# Patient Record
Sex: FEMALE | Race: BLACK OR AFRICAN AMERICAN | Employment: PART TIME | URBAN - METROPOLITAN AREA
[De-identification: names, ages, dates, MRNs, and addresses within clinical notes are randomized per-mention and may not be internally consistent; named-entity substitution may affect disease eponyms.]

---

## 2018-09-30 ENCOUNTER — HOSPITAL ENCOUNTER (EMERGENCY)
Age: 68
Discharge: HOME | End: 2018-09-30
Payer: MEDICARE

## 2018-09-30 ENCOUNTER — APPOINTMENT (OUTPATIENT)
Dept: GENERAL RADIOLOGY | Age: 68
End: 2018-09-30
Payer: MEDICARE

## 2018-09-30 VITALS
BODY MASS INDEX: 26.2 KG/M2 | WEIGHT: 163 LBS | RESPIRATION RATE: 16 BRPM | DIASTOLIC BLOOD PRESSURE: 73 MMHG | OXYGEN SATURATION: 97 % | HEIGHT: 66 IN | HEART RATE: 76 BPM | TEMPERATURE: 97.1 F | SYSTOLIC BLOOD PRESSURE: 157 MMHG

## 2018-09-30 DIAGNOSIS — S82.092A OTHER CLOSED FRACTURE OF LEFT PATELLA, INITIAL ENCOUNTER: Primary | ICD-10-CM

## 2018-09-30 PROCEDURE — 99283 EMERGENCY DEPT VISIT LOW MDM: CPT

## 2018-09-30 PROCEDURE — 6370000000 HC RX 637 (ALT 250 FOR IP): Performed by: NURSE PRACTITIONER

## 2018-09-30 PROCEDURE — 73562 X-RAY EXAM OF KNEE 3: CPT

## 2018-09-30 RX ORDER — HYDROCODONE BITARTRATE AND ACETAMINOPHEN 5; 325 MG/1; MG/1
1 TABLET ORAL EVERY 6 HOURS PRN
Qty: 12 TABLET | Refills: 0 | Status: SHIPPED | OUTPATIENT
Start: 2018-09-30 | End: 2018-10-03

## 2018-09-30 RX ORDER — HYDROCODONE BITARTRATE AND ACETAMINOPHEN 5; 325 MG/1; MG/1
1 TABLET ORAL ONCE
Status: COMPLETED | OUTPATIENT
Start: 2018-09-30 | End: 2018-09-30

## 2018-09-30 RX ADMIN — HYDROCODONE BITARTRATE AND ACETAMINOPHEN 1 TABLET: 5; 325 TABLET ORAL at 20:40

## 2018-09-30 ASSESSMENT — PAIN DESCRIPTION - DESCRIPTORS: DESCRIPTORS: ACHING

## 2018-09-30 ASSESSMENT — PAIN SCALES - GENERAL: PAINLEVEL_OUTOF10: 6

## 2018-09-30 ASSESSMENT — PAIN DESCRIPTION - ORIENTATION: ORIENTATION: LEFT

## 2018-09-30 ASSESSMENT — PAIN DESCRIPTION - PAIN TYPE: TYPE: ACUTE PAIN

## 2018-09-30 ASSESSMENT — PAIN DESCRIPTION - LOCATION: LOCATION: KNEE

## 2019-10-04 ENCOUNTER — APPOINTMENT (OUTPATIENT)
Dept: GENERAL RADIOLOGY | Age: 69
End: 2019-10-04
Payer: MEDICARE

## 2019-10-04 ENCOUNTER — HOSPITAL ENCOUNTER (EMERGENCY)
Age: 69
Discharge: HOME | End: 2019-10-04
Payer: MEDICARE

## 2019-10-04 VITALS
BODY MASS INDEX: 25.55 KG/M2 | WEIGHT: 159 LBS | TEMPERATURE: 97.6 F | SYSTOLIC BLOOD PRESSURE: 152 MMHG | HEIGHT: 66 IN | OXYGEN SATURATION: 96 % | HEART RATE: 78 BPM | RESPIRATION RATE: 18 BRPM | DIASTOLIC BLOOD PRESSURE: 94 MMHG

## 2019-10-04 DIAGNOSIS — M25.462 EFFUSION OF LEFT KNEE: ICD-10-CM

## 2019-10-04 DIAGNOSIS — M25.562 ACUTE PAIN OF LEFT KNEE: Primary | ICD-10-CM

## 2019-10-04 PROCEDURE — 73564 X-RAY EXAM KNEE 4 OR MORE: CPT

## 2019-10-04 PROCEDURE — 6370000000 HC RX 637 (ALT 250 FOR IP): Performed by: NURSE PRACTITIONER

## 2019-10-04 PROCEDURE — 99283 EMERGENCY DEPT VISIT LOW MDM: CPT

## 2019-10-04 RX ORDER — IBUPROFEN 600 MG/1
600 TABLET ORAL EVERY 8 HOURS PRN
Qty: 21 TABLET | Refills: 0 | Status: SHIPPED | OUTPATIENT
Start: 2019-10-04

## 2019-10-04 RX ORDER — IBUPROFEN 400 MG/1
600 TABLET ORAL ONCE
Status: COMPLETED | OUTPATIENT
Start: 2019-10-04 | End: 2019-10-04

## 2019-10-04 RX ADMIN — IBUPROFEN 600 MG: 400 TABLET, FILM COATED ORAL at 17:33

## 2019-10-04 ASSESSMENT — PAIN SCALES - GENERAL: PAINLEVEL_OUTOF10: 6

## 2021-02-17 ENCOUNTER — HOSPITAL ENCOUNTER (OUTPATIENT)
Age: 71
Discharge: HOME | End: 2021-02-17
Payer: MEDICARE

## 2021-02-17 LAB
ALBUMIN SERPL-MCNC: 4.1 G/DL (ref 3.5–5.2)
ALP BLD-CCNC: 66 U/L (ref 35–104)
ALT SERPL-CCNC: 14 U/L (ref 0–32)
ANION GAP SERPL CALCULATED.3IONS-SCNC: 11 MMOL/L (ref 7–16)
AST SERPL-CCNC: 22 U/L (ref 0–31)
BASOPHILS ABSOLUTE: 0.04 E9/L (ref 0–0.2)
BASOPHILS RELATIVE PERCENT: 0.9 % (ref 0–2)
BILIRUB SERPL-MCNC: 0.6 MG/DL (ref 0–1.2)
BUN BLDV-MCNC: 16 MG/DL (ref 8–23)
CALCIUM SERPL-MCNC: 9.3 MG/DL (ref 8.6–10.2)
CHLORIDE BLD-SCNC: 105 MMOL/L (ref 98–107)
CO2: 27 MMOL/L (ref 22–29)
CREAT SERPL-MCNC: 1.2 MG/DL (ref 0.5–1)
EOSINOPHILS ABSOLUTE: 0.15 E9/L (ref 0.05–0.5)
EOSINOPHILS RELATIVE PERCENT: 3.3 % (ref 0–6)
FERRITIN: 89 NG/ML
FOLATE: 13.5 NG/ML (ref 4.8–24.2)
GFR AFRICAN AMERICAN: 53
GFR NON-AFRICAN AMERICAN: 53 ML/MIN/1.73
GLUCOSE BLD-MCNC: 85 MG/DL (ref 74–99)
HCT VFR BLD CALC: 36.6 % (ref 34–48)
HEMOGLOBIN: 11.4 G/DL (ref 11.5–15.5)
IMMATURE GRANULOCYTES #: 0 E9/L
IMMATURE GRANULOCYTES %: 0 % (ref 0–5)
IRON SATURATION: 34 % (ref 15–50)
IRON: 115 MCG/DL (ref 37–145)
LYMPHOCYTES ABSOLUTE: 1.75 E9/L (ref 1.5–4)
LYMPHOCYTES RELATIVE PERCENT: 39.1 % (ref 20–42)
MCH RBC QN AUTO: 27 PG (ref 26–35)
MCHC RBC AUTO-ENTMCNC: 31.1 % (ref 32–34.5)
MCV RBC AUTO: 86.7 FL (ref 80–99.9)
MONOCYTES ABSOLUTE: 0.42 E9/L (ref 0.1–0.95)
MONOCYTES RELATIVE PERCENT: 9.4 % (ref 2–12)
NEUTROPHILS ABSOLUTE: 2.12 E9/L (ref 1.8–7.3)
NEUTROPHILS RELATIVE PERCENT: 47.3 % (ref 43–80)
PDW BLD-RTO: 14.5 FL (ref 11.5–15)
PLATELET # BLD: 222 E9/L (ref 130–450)
PMV BLD AUTO: 11.1 FL (ref 7–12)
POTASSIUM SERPL-SCNC: 4 MMOL/L (ref 3.5–5)
RBC # BLD: 4.22 E12/L (ref 3.5–5.5)
SODIUM BLD-SCNC: 143 MMOL/L (ref 132–146)
TOTAL IRON BINDING CAPACITY: 339 MCG/DL (ref 250–450)
TOTAL PROTEIN: 7.9 G/DL (ref 6.4–8.3)
TSH SERPL DL<=0.05 MIU/L-ACNC: 2.55 UIU/ML (ref 0.27–4.2)
VITAMIN B-12: 426 PG/ML (ref 211–946)
WBC # BLD: 4.5 E9/L (ref 4.5–11.5)

## 2021-02-17 PROCEDURE — 80053 COMPREHEN METABOLIC PANEL: CPT

## 2021-02-17 PROCEDURE — 87045 FECES CULTURE AEROBIC BACT: CPT

## 2021-02-17 PROCEDURE — 89055 LEUKOCYTE ASSESSMENT FECAL: CPT

## 2021-02-17 PROCEDURE — 82607 VITAMIN B-12: CPT

## 2021-02-17 PROCEDURE — 87077 CULTURE AEROBIC IDENTIFY: CPT

## 2021-02-17 PROCEDURE — 87329 GIARDIA AG IA: CPT

## 2021-02-17 PROCEDURE — 83550 IRON BINDING TEST: CPT

## 2021-02-17 PROCEDURE — 83516 IMMUNOASSAY NONANTIBODY: CPT

## 2021-02-17 PROCEDURE — 85025 COMPLETE CBC W/AUTO DIFF WBC: CPT

## 2021-02-17 PROCEDURE — 83540 ASSAY OF IRON: CPT

## 2021-02-17 PROCEDURE — 84443 ASSAY THYROID STIM HORMONE: CPT

## 2021-02-17 PROCEDURE — 82728 ASSAY OF FERRITIN: CPT

## 2021-02-17 PROCEDURE — 36415 COLL VENOUS BLD VENIPUNCTURE: CPT

## 2021-02-17 PROCEDURE — 82746 ASSAY OF FOLIC ACID SERUM: CPT

## 2021-02-18 ENCOUNTER — HOSPITAL ENCOUNTER (OUTPATIENT)
Age: 71
Discharge: HOME | End: 2021-02-18
Payer: MEDICARE

## 2021-02-19 LAB
GIARDIA ANTIGEN STOOL: NORMAL
WHITE BLOOD CELLS (WBC), STOOL: NORMAL

## 2021-02-20 LAB
CULTURE, STOOL: NORMAL
TISSUE TRANSGLUTAMINASE ANTIBODY: 3 U/ML (ref 0–5)
TISSUE TRANSGLUTAMINASE IGA: <2 U/ML (ref 0–3)

## 2021-10-28 ENCOUNTER — HOSPITAL ENCOUNTER (OUTPATIENT)
Age: 71
Discharge: HOME | End: 2021-10-28
Payer: MEDICARE

## 2021-10-28 PROCEDURE — 87338 HPYLORI STOOL AG IA: CPT

## 2021-10-30 LAB — H PYLORI ANTIGEN STOOL: NEGATIVE

## 2022-01-12 ENCOUNTER — HOSPITAL ENCOUNTER (OUTPATIENT)
Dept: GENERAL RADIOLOGY | Age: 72
Discharge: HOME | End: 2022-01-14
Payer: MEDICARE

## 2022-01-12 DIAGNOSIS — Z12.31 VISIT FOR SCREENING MAMMOGRAM: ICD-10-CM

## 2022-01-12 PROCEDURE — 77063 BREAST TOMOSYNTHESIS BI: CPT

## 2022-01-14 ENCOUNTER — TELEPHONE (OUTPATIENT)
Dept: GENERAL RADIOLOGY | Age: 72
End: 2022-01-14

## 2022-01-14 NOTE — TELEPHONE ENCOUNTER
Call to patient in reference to her mammogram performed at [address] [place] [address] Dr Reyes on January 12, 2022. Instructed patient that the radiologist has recommended some additional breast imaging, in order to make a final determination/result. Verbalizes understanding and is agreeable to proceed.        Margarito Farmer, RN, BSN, [address]

## 2022-01-20 ENCOUNTER — HOSPITAL ENCOUNTER (OUTPATIENT)
Dept: GENERAL RADIOLOGY | Age: 72
Discharge: HOME | End: 2022-01-22
Payer: MEDICARE

## 2022-01-20 DIAGNOSIS — R92.8 ABNORMAL MAMMOGRAM OF RIGHT BREAST: ICD-10-CM

## 2022-01-20 PROCEDURE — 76642 ULTRASOUND BREAST LIMITED: CPT

## 2022-01-20 PROCEDURE — 77065 DX MAMMO INCL CAD UNI: CPT

## 2023-02-28 ENCOUNTER — APPOINTMENT (OUTPATIENT)
Dept: GENERAL RADIOLOGY | Age: 73
End: 2023-02-28
Payer: MEDICARE

## 2023-02-28 ENCOUNTER — APPOINTMENT (OUTPATIENT)
Dept: CT IMAGING | Age: 73
End: 2023-02-28
Payer: MEDICARE

## 2023-02-28 ENCOUNTER — HOSPITAL ENCOUNTER (EMERGENCY)
Age: 73
Discharge: HOME | End: 2023-02-28
Payer: MEDICARE

## 2023-02-28 VITALS
WEIGHT: 150 LBS | RESPIRATION RATE: 18 BRPM | DIASTOLIC BLOOD PRESSURE: 98 MMHG | TEMPERATURE: 97.4 F | BODY MASS INDEX: 24.21 KG/M2 | SYSTOLIC BLOOD PRESSURE: 154 MMHG | OXYGEN SATURATION: 100 % | HEART RATE: 61 BPM

## 2023-02-28 DIAGNOSIS — S00.83XA CONTUSION OF FACE, INITIAL ENCOUNTER: ICD-10-CM

## 2023-02-28 DIAGNOSIS — M25.462 EFFUSION, LEFT KNEE: ICD-10-CM
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
## 2023-02-28 DIAGNOSIS — M25.562 ACUTE PAIN OF LEFT KNEE: ICD-10-CM

## 2023-02-28 DIAGNOSIS — S09.90XA CLOSED HEAD INJURY, INITIAL ENCOUNTER: ICD-10-CM

## 2023-02-28 PROCEDURE — 73562 X-RAY EXAM OF KNEE 3: CPT

## 2023-02-28 PROCEDURE — 70450 CT HEAD/BRAIN W/O DYE: CPT

## 2023-02-28 PROCEDURE — 72125 CT NECK SPINE W/O DYE: CPT

## 2023-02-28 PROCEDURE — 99284 EMERGENCY DEPT VISIT MOD MDM: CPT

## 2023-02-28 PROCEDURE — 6370000000 HC RX 637 (ALT 250 FOR IP): Performed by: PHYSICIAN ASSISTANT

## 2023-02-28 RX ORDER — ACETAMINOPHEN 500 MG
1000 TABLET ORAL ONCE
Status: COMPLETED | OUTPATIENT
Start: 2023-02-28 | End: 2023-02-28

## 2023-02-28 RX ADMIN — ACETAMINOPHEN 1000 MG: 500 TABLET ORAL at 11:32

## 2023-02-28 ASSESSMENT — LIFESTYLE VARIABLES
HOW MANY STANDARD DRINKS CONTAINING ALCOHOL DO YOU HAVE ON A TYPICAL DAY: PATIENT DOES NOT DRINK
HOW OFTEN DO YOU HAVE A DRINK CONTAINING ALCOHOL: NEVER

## 2023-02-28 ASSESSMENT — PAIN - FUNCTIONAL ASSESSMENT: PAIN_FUNCTIONAL_ASSESSMENT: 0-10

## 2023-02-28 ASSESSMENT — PAIN SCALES - GENERAL
PAINLEVEL_OUTOF10: 5
PAINLEVEL_OUTOF10: 5

## 2023-02-28 NOTE — Clinical Note
Marleen Christina was seen and treated in our emergency department on 2/28/2023. She may return to work on 03/01/2023. If you have any questions or concerns, please don't hesitate to call.       Emelia Kwan PA-C

## 2023-02-28 NOTE — ED PROVIDER NOTES
Independent [facility] Visit. Department of Emergency Medicine   ED  Provider Note  Admit Date/Time: 2/28/2023 11:10 AM  ED Bed: ST04/ST-04     MRN: [id number]  CHIEF COMPLAINT:   Fall (Slip on wet floor at work, landed on left knee and hit left of face )       HISTORY OF PRESENT ILLNESS:      Fabiana Martinez is a 67 y.o. female who presents to the ED for mechanical fall at that happened at work prior to arrival.  Patient states she slipped on the wet floor and fell onto her left knee. She also reports hitting the left side of her face and head. She denies any loss of consciousness. Patient does not take anticoagulation. She states she does have some soreness to the left side of her face and left neck. She denies chest pain, shortness of breath, pain with breathing, abdominal pain, nausea, vomiting, numbness/tingling, sensation changes, back pain, or difficulty with ambulation or balance. Patient does have pain to her left knee but is still able to ambulate independently. She does report a history of meniscus tear to affected knee. She is alert and oriented x3 and in no apparent distress at this exam.  Patient is nontoxic-appearing. PCP: Candy Sweeney MD  Ortho: Dr. Kaylah Mendoza:   Pertinent positives and negatives are stated within HPI, all other systems reviewed and are negative. Past Medical History:   Past Medical History:   Diagnosis Date    Heart disease     MITRAL VALVE PROLAPSE    History of blood transfusion     Hypertension      Past Surgical History:   Past Surgical History:   Procedure Laterality Date    BACK SURGERY      HYSTERECTOMY      KNEE ARTHROSCOPY      left    TUBAL LIGATION       Social History:  reports that she has never smoked. She does not have any smokeless tobacco history on file. She reports that she does not drink alcohol. Family History: family history includes Lung Cancer in her brother; Other in her sister; Seizures in her son.      Allergies: Codeine    PHYSICAL EXAM:     Vitals:    02/28/23 0944 02/28/23 1013   BP:  (!) 154/98   Pulse: 61    Resp:  18   Temp: 97.4 °F (36.3 °C)    TempSrc: Temporal    SpO2: 100%    Weight:  150 lb (68 kg)   Oxygen Saturation Interpretation: Normal    Physical Exam   Constitutional/General: Alert and oriented x4, well appearing, non toxic in NAD  HEENT:  NC/NT. EOMI, Airway patent, No broken or loose teeth, no blood in ears or nares, no bruising  Neck: Supple, full ROM, non tender with no meningeal signs, no lymphadenopathy   Respiratory: Lungs clear to auscultation bilaterally with good air flow, Not in respiratory distress  CV:  Regular rate. Regular rhythm. GI:  Abdomen soft, Non tender, Non distended. No rebound, guarding, or rigidity. No pulsatile masses. Musculoskeletal: Mild tenderness to palpation to left knee, full range of motion, intact sensations distally   Integument: Skin warm and dry. No rashes. Neurologic: GCS 15, no focal deficits, symmetric strength 5/5 in the upper and lower extremities bilaterally, CN II-XII grossly intact    PROCEDURES:   None     LAB/IMAGING RESULTS:   (All laboratory and radiology results have been personally reviewed by myself)  Labs:  No results found for this visit on 02/28/23. Imaging: All Radiology results interpreted by Radiologist unless otherwise noted. XR KNEE LEFT (3 VIEWS)   Final Result   Degenerative changes and moderately-sized effusion. Probable old patella   fracture. No acute bony abnormality. CT HEAD WO CONTRAST   Final Result   1. There is no acute intracranial abnormality. Specifically, there is no   intracranial hemorrhage. 2. Atrophy and periventricular leukomalacia,         CT CERVICAL SPINE WO CONTRAST   Final Result   1. There is no acute compression fracture or subluxation of the cervical   spine. 2. Multilevel degenerative disc and degenerative joint disease.            -- MEDICAL DECISION MAKING --   History From: History from : Patient  Limitations to history : None    Record Review:  Outpatient Notes reviewed  Other Records Reviewed : None    CC/HPI Summary, DDx, ED Course, and Reassessment:   Patient presents to the ED for Fall (Slip on wet floor at work, landed on left knee and hit left of face )    This is a 69-year-old after having a mechanical fall at work prior to arrival.  X-ray of the knee revealed effusion, CT head and neck revealed no intracranial bleeding, facial bone fractures, or cervical fractures. Patient advised to follow-up with orthopedics. She will also be given Worker's Comp. follow-up    Patient was given the following medications:  Medications   acetaminophen (TYLENOL) tablet 1,000 mg (1,000 mg Oral Given 2/28/23 1132)      Differential diagnoses included but not limited to patellar fracture, knee contusion, closed head injury      Reassessment:  Patient was given Tylenol for their symptoms with mild improvement. Patient continues to be non-toxic on re-evaluation. Chronic Conditions:   Past Medical History:   Diagnosis Date    Heart disease     MITRAL VALVE PROLAPSE    History of blood transfusion     Hypertension      ED COURSE:      Any labs and imaging were reviewed by myself. Consultations:  None  Discussion with Other Profesionals : None    COUNSELING:   I have spoken with the patient/caregiver and discussed todays results, in addition to providing specific details for the plan of care and counseling regarding the diagnosis and prognosis and are agreeable with the plan. All results reviewed with pt and all questions answered. I discussed the differential, results and discharge plan with the patient and/or family/friend/caregiver if present. I emphasized the importance of follow-up with the physician I referred them to in the timeframe recommended. I explained reasons for the patient to return to the Emergency Department.  Additional verbal discharge instructions were also given and discussed with the patient to supplement those generated by the EMR. We also discussed medications that were prescribed (if any) including common side effects and interactions. The patient was advised to abstain from driving, operating heavy machinery or making significant decisions while taking medications such as opiates and muscle relaxers that may impair this. All questions were addressed. They understand return precautions and discharge instructions. The patient and/or family/friend/caregiver expressed understanding. Vitals were stable and they were in no distress at discharge. Findings were discussed with the patient and reasons to immediately return to the ED were articulated to them. Patient will follow-up with their PMD  DISPOSITION CONSIDERATIONS:    Disposition Considerations:  (include Tests not done, Shared Decision Making, Pt Expectation of Test or Tx.):   Appropriate for outpatient management        Social Determinants:  Social Determinants : None    MEDICATIONS:   DISCHARGE MEDICATIONS:  Discharge Medication List as of 2/28/2023 11:47 AM        DISCONTINUED MEDICATIONS:  Discharge Medication List as of 2/28/2023 11:47 AM        DIAGNOSIS:     1. Fall on same level from slipping, tripping or stumbling, initial encounter    2. Effusion, left knee    3. Acute pain of left knee    4. Closed head injury, initial encounter    5. Contusion of face, initial encounter        This patient's ED course included: a personal history and physicial examination  This patient has remained hemodynamically stable during their ED course. DISPOSITION:   Discharge to home. Patient condition is good. Discharge Instructions:   Patient referred to  [address] [place] [address] -- [address] [place] [address]  [address] [facility]   Errol Marmolejo [facility] [address] [id number]  [phone]  Call in 1 day  for follow-up on ED visit for workers comp visit    Haleigh Morse MD  [place] [address]  [phone]    Schedule an appointment as soon as possible for a visit in 2 days  for follow-up on ED visit    Electronically signed by Nathanael Berger PA-C   DD: 2/28/23  I am the Primary Clinician of Record. **This report was transcribed using voice recognition software. Every effort was made to ensure accuracy; however, inadvertent computerized transcription errors may be present.     END OF PROVIDER NOTE       Nathanael Berger PA-C  02/28/23 [address] PARISA Hobbs  03/04/23 [id number]

## 2023-03-20 ENCOUNTER — HOSPITAL ENCOUNTER (OUTPATIENT)
Dept: PHYSICAL THERAPY | Age: 73
Setting detail: THERAPIES SERIES
Discharge: HOME | End: 2023-03-20
Payer: COMMERCIAL

## 2023-03-20 PROCEDURE — 97161 PT EVAL LOW COMPLEX 20 MIN: CPT

## 2023-03-20 NOTE — PROGRESS NOTES
[address] [facility]                Phone: [phone]   Fax: [phone]    Physical Therapy  Out Patient Initial Evaluation    Date:  3/20/2023    Patient Name:  Xu Garcia    :  1950  MRN: [id number]    DIAGNOSIS:  LT Knee Effusion   EVALUATION DATE:  3/20/23  REFERRING PHYSICIAN:  Dr Prasanth Omalley DO  ONSET DATE:    CERTIFICATION PERIOD:  3/20/23 to 23 (C-9 date 23)     PROBLEMS FOUND DURING EVALUATION  Pain LT knee 2-3/10 anterior inferior pole patella  Decreased pain free AROM in flex. LT knee AROM WNL but painful open and inc closed chain loading. Moderate pain with steps up> Down leading for need of a HR to negotiate. SHORT TERM GOALS (6 visits)  Imp pain 0-2/10 LT knee with walking,steps etc.  Pain free LT knee AROM in open chain  Indep HEP    LONG TERM GOALS (12 visits)  No Pain to pre-injury baseline levels LT knee   Pain free AROM in closed chain loading LT knee  Reciprocal steps up/down pain free    PATIENT GOALS  No pain with walking, steps    REHAB POTENTIAL:  Good    RECOMMENDED TREATMENT PLAN TO ACHIEVE THE MUTUAL LONG TERM GOALS:  Progressive ROM/MMT, Manual, Modalities, Inst HEP    FREQUENCY/DURATION:  3x/week x 4 wks (C-9 good till 23), will need extension 2/2 start date today 3/20/23      Thank you for the opportunity to work with your patient. If you have questions or comments, please feel free to contact me by phone or fax.       Electronically Signed by Eduardo Johnson, PT  3/20/2023        ___________________________________  3/20/2023    Physician     Date

## 2023-03-20 NOTE — PROGRESS NOTES
[facility]                Phone: [phone]   Fax: [phone]    Physical Therapy Daily Treatment Note  Date:  3/20/2023    Patient Name:  Harvy Rinne    :  1950  MRN: [id number]    Restrictions/Precautions:  None  Diagnosis:  LT knee Effusion  Insurance/Certification information:  C-9 till 23   Referring Physician:  Dr Idalmis Titus DO  Plan of care signed (Y/N):    Visit# / total visits:     Auth/C-9 good till 23  Pain level: /10   Time In:  Time Out:    Subjective:      Exercises:  Exercise/Equipment Resistance/Repetitions Other comments                                                                                                                                             Other Therapeutic Activities:      Home Exercise Program:      Manual Treatments:      Modalities:      Treatment/Activity Tolerance:  [] Patient tolerated treatment well [] Patient limited by fatique  [] Patient limited by pain  [] Patient limited by other medical complications  [] Other:     Plan:   [] Continue per plan of care [] Alter current plan (see comments)  [] Plan of care initiated [] Hold pending MD visit [] Discharge    Electronically signed by:  Franchesca Márquez PT

## 2023-03-22 ENCOUNTER — HOSPITAL ENCOUNTER (OUTPATIENT)
Dept: PHYSICAL THERAPY | Age: 73
Setting detail: THERAPIES SERIES
Discharge: HOME | End: 2023-03-22
Payer: COMMERCIAL

## 2023-03-22 PROCEDURE — 97110 THERAPEUTIC EXERCISES: CPT

## 2023-03-24 ENCOUNTER — HOSPITAL ENCOUNTER (OUTPATIENT)
Dept: PHYSICAL THERAPY | Age: 73
Setting detail: THERAPIES SERIES
Discharge: HOME | End: 2023-03-24
Payer: COMMERCIAL

## 2023-03-24 NOTE — PROGRESS NOTES
[address] [facility]                Phone: [phone]  Fax: [phone]    Physical Therapy  Cancellation/No-show Note  Patient Name:  Tahir Macdonald  :  1950   Date:  3/24/2023    For today's appointment patient:  []  Cancelled  []  Rescheduled appointment  [x]  No-show     Reason given by patient:  []  Patient ill  []  Conflicting appointment  []  No transportation    []  Conflict with work  []  No reason given  []  Other:     Comments:      Electronically signed by:  Krystal Mcallister PT

## 2023-03-25 ENCOUNTER — HOSPITAL ENCOUNTER (OUTPATIENT)
Dept: MAMMOGRAPHY | Age: 73
End: 2023-03-25
Payer: MEDICARE

## 2023-03-25 DIAGNOSIS — Z12.31 VISIT FOR SCREENING MAMMOGRAM: ICD-10-CM

## 2023-03-25 LAB
ANION GAP SERPL CALCULATED.3IONS-SCNC: 9 MMOL/L (ref 7–16)
BUN SERPL-MCNC: 21 MG/DL (ref 6–23)
CALCIUM SERPL-MCNC: 8.6 MG/DL (ref 8.6–10.2)
CHLORIDE SERPL-SCNC: 107 MMOL/L (ref 98–107)
CHOLESTEROL, TOTAL: 229 MG/DL (ref 0–199)
CO2 SERPL-SCNC: 30 MMOL/L (ref 22–29)
CREAT SERPL-MCNC: 1.2 MG/DL (ref 0.5–1)
GLUCOSE SERPL-MCNC: 84 MG/DL (ref 74–99)
HBA1C MFR BLD: 6 % (ref 4–5.6)
HDLC SERPL-MCNC: 103 MG/DL
LDLC SERPL CALC-MCNC: 118 MG/DL (ref 0–99)
POTASSIUM SERPL-SCNC: 3.8 MMOL/L (ref 3.5–5)
SODIUM SERPL-SCNC: 146 MMOL/L (ref 132–146)
TRIGL SERPL-MCNC: 40 MG/DL (ref 0–149)
VLDLC SERPL CALC-MCNC: 8 MG/DL

## 2023-03-25 PROCEDURE — 77063 BREAST TOMOSYNTHESIS BI: CPT

## 2023-03-29 ENCOUNTER — HOSPITAL ENCOUNTER (OUTPATIENT)
Dept: PHYSICAL THERAPY | Age: 73
Setting detail: THERAPIES SERIES
Discharge: HOME | End: 2023-03-29
Payer: COMMERCIAL

## 2023-03-29 PROCEDURE — 97530 THERAPEUTIC ACTIVITIES: CPT

## 2023-03-29 PROCEDURE — 97110 THERAPEUTIC EXERCISES: CPT

## 2023-03-29 NOTE — PROGRESS NOTES
flex in bars    3# CW ankle @ 4 x 10  No diff at all, no inc LT knee pain                                                              Other Therapeutic Activities:      Home Exercise Program:      Manual Treatments:      Modalities:      Treatment/Activity Tolerance:  [] Patient tolerated treatment well [] Patient limited by fatique  [] Patient limited by pain  [] Patient limited by other medical complications  [] Other:     Plan:   [] Continue per plan of care [] Alter current plan (see comments)  [] Plan of care initiated [] Hold pending MD visit [] Discharge    Electronically signed by:  Melba Brown PT

## 2023-03-30 ENCOUNTER — CLINICAL DOCUMENTATION (OUTPATIENT)
Dept: GENERAL RADIOLOGY | Age: 73
End: 2023-03-30

## 2023-04-03 ENCOUNTER — HOSPITAL ENCOUNTER (OUTPATIENT)
Dept: PHYSICAL THERAPY | Age: 73
Setting detail: THERAPIES SERIES
Discharge: HOME | End: 2023-04-03
Payer: COMMERCIAL

## 2023-04-03 PROCEDURE — 97110 THERAPEUTIC EXERCISES: CPT

## 2023-04-03 PROCEDURE — 97530 THERAPEUTIC ACTIVITIES: CPT

## 2023-04-03 NOTE — PROGRESS NOTES
ankle @ 4 x 10  No diff at all, no inc LT knee pain                                                              Other Therapeutic Activities:      Home Exercise Program:      Manual Treatments:      Modalities:      Treatment/Activity Tolerance:  [] Patient tolerated treatment well [] Patient limited by fatique  [] Patient limited by pain  [] Patient limited by other medical complications  [] Other:     Plan:   [] Continue per plan of care [] Alter current plan (see comments)  [] Plan of care initiated [] Hold pending MD visit [] Discharge    Electronically signed by:  Mehnaz Orellana PT

## 2023-04-05 ENCOUNTER — APPOINTMENT (OUTPATIENT)
Dept: PHYSICAL THERAPY | Age: 73
End: 2023-04-05
Payer: COMMERCIAL

## 2023-04-06 ENCOUNTER — HOSPITAL ENCOUNTER (OUTPATIENT)
Dept: PHYSICAL THERAPY | Age: 73
Setting detail: THERAPIES SERIES
Discharge: HOME | End: 2023-04-06

## 2023-04-06 NOTE — PROGRESS NOTES
[address] [facility]                Phone: [phone]  Fax: [phone]    Physical Therapy  Cancellation/No-show Note  Patient Name:  Kellee Pham  :  1950   Date:  2023    For today's appointment patient:  []  Cancelled  []  Rescheduled appointment  [x]  No-show     Reason given by patient:  []  Patient ill  []  Conflicting appointment  []  No transportation    []  Conflict with work  []  No reason given  []  Other:     Comments:  Hiccup in schedule. Was scheduled for today but came in yesterday. ? Remembering had Tx today.     Electronically signed by:  Sahil Weinberg, PT

## 2024-04-09 ENCOUNTER — TRANSCRIBE ORDERS (OUTPATIENT)
Dept: ADMINISTRATIVE | Age: 74
End: 2024-04-09

## 2024-04-09 DIAGNOSIS — S83.92XA SPRAIN OF LEFT KNEE, UNSPECIFIED LIGAMENT, INITIAL ENCOUNTER: Primary | ICD-10-CM

## 2024-04-26 ENCOUNTER — HOSPITAL ENCOUNTER (OUTPATIENT)
Dept: MRI IMAGING | Age: 74
Discharge: HOME | End: 2024-04-26
Payer: COMMERCIAL

## 2024-04-26 DIAGNOSIS — S83.92XA SPRAIN OF LEFT KNEE, UNSPECIFIED LIGAMENT, INITIAL ENCOUNTER: ICD-10-CM

## 2024-04-26 PROCEDURE — 73721 MRI JNT OF LWR EXTRE W/O DYE: CPT

## 2024-05-31 ENCOUNTER — HOSPITAL ENCOUNTER (OUTPATIENT)
Dept: GENERAL RADIOLOGY | Age: 74
End: 2024-05-31
Payer: MEDICARE

## 2024-05-31 VITALS — WEIGHT: 150 LBS | HEIGHT: 66 IN | BODY MASS INDEX: 24.11 KG/M2

## 2024-05-31 DIAGNOSIS — Z12.31 ENCOUNTER FOR SCREENING MAMMOGRAM FOR BREAST CANCER: ICD-10-CM

## 2024-05-31 PROCEDURE — 77063 BREAST TOMOSYNTHESIS BI: CPT

## 2024-06-27 ENCOUNTER — CLINICAL DOCUMENTATION (OUTPATIENT)
Dept: GENERAL RADIOLOGY | Age: 74
End: 2024-06-27

## 2024-06-27 NOTE — PROGRESS NOTES
Mammogram clinical report provided to patient. Report sent to Dr. Arely Colorado as per patient's request.